# Patient Record
Sex: FEMALE | Race: WHITE | NOT HISPANIC OR LATINO | Employment: FULL TIME | URBAN - METROPOLITAN AREA
[De-identification: names, ages, dates, MRNs, and addresses within clinical notes are randomized per-mention and may not be internally consistent; named-entity substitution may affect disease eponyms.]

---

## 2023-07-13 DIAGNOSIS — R06.00 DYSPNEA, UNSPECIFIED TYPE: Primary | ICD-10-CM

## 2023-07-13 RX ORDER — ALBUTEROL SULFATE 90 UG/1
AEROSOL, METERED RESPIRATORY (INHALATION)
Qty: 8.5 G | Refills: 3 | Status: SHIPPED | OUTPATIENT
Start: 2023-07-13

## 2023-10-02 ENCOUNTER — LAB (OUTPATIENT)
Dept: LAB | Facility: LAB | Age: 38
End: 2023-10-02
Payer: COMMERCIAL

## 2023-10-02 ENCOUNTER — OFFICE VISIT (OUTPATIENT)
Dept: PRIMARY CARE | Facility: CLINIC | Age: 38
End: 2023-10-02
Payer: COMMERCIAL

## 2023-10-02 VITALS
RESPIRATION RATE: 17 BRPM | OXYGEN SATURATION: 98 % | HEART RATE: 78 BPM | DIASTOLIC BLOOD PRESSURE: 86 MMHG | SYSTOLIC BLOOD PRESSURE: 122 MMHG | TEMPERATURE: 98.4 F | WEIGHT: 234 LBS | BODY MASS INDEX: 41.45 KG/M2

## 2023-10-02 DIAGNOSIS — R23.2 HOT FLASHES: ICD-10-CM

## 2023-10-02 DIAGNOSIS — R73.03 PREDIABETES: ICD-10-CM

## 2023-10-02 DIAGNOSIS — R10.12 LEFT UPPER QUADRANT ABDOMINAL PAIN: ICD-10-CM

## 2023-10-02 PROBLEM — E66.9 OBESITY: Status: ACTIVE | Noted: 2023-10-02

## 2023-10-02 PROBLEM — J30.9 ALLERGIC RHINITIS: Status: ACTIVE | Noted: 2023-10-02

## 2023-10-02 PROBLEM — E55.9 VITAMIN D DEFICIENCY: Status: ACTIVE | Noted: 2023-10-02

## 2023-10-02 PROBLEM — B00.9 HERPES SIMPLEX: Status: ACTIVE | Noted: 2023-10-02

## 2023-10-02 PROBLEM — J45.909 ASTHMA (HHS-HCC): Status: ACTIVE | Noted: 2023-10-02

## 2023-10-02 LAB
ALBUMIN SERPL BCP-MCNC: 4.1 G/DL (ref 3.4–5)
ALP SERPL-CCNC: 62 U/L (ref 33–110)
ALT SERPL W P-5'-P-CCNC: 23 U/L (ref 7–45)
ANION GAP SERPL CALC-SCNC: 13 MMOL/L (ref 10–20)
AST SERPL W P-5'-P-CCNC: 15 U/L (ref 9–39)
BILIRUB SERPL-MCNC: 0.3 MG/DL (ref 0–1.2)
BUN SERPL-MCNC: 8 MG/DL (ref 6–23)
CALCIUM SERPL-MCNC: 9.3 MG/DL (ref 8.6–10.3)
CHLORIDE SERPL-SCNC: 104 MMOL/L (ref 98–107)
CO2 SERPL-SCNC: 26 MMOL/L (ref 21–32)
CREAT SERPL-MCNC: 0.75 MG/DL (ref 0.5–1.05)
ERYTHROCYTE [DISTWIDTH] IN BLOOD BY AUTOMATED COUNT: 14.3 % (ref 11.5–14.5)
GFR SERPL CREATININE-BSD FRML MDRD: >90 ML/MIN/1.73M*2
GLUCOSE SERPL-MCNC: 112 MG/DL (ref 74–99)
HCT VFR BLD AUTO: 39.5 % (ref 36–46)
HGB BLD-MCNC: 12.9 G/DL (ref 12–16)
LIPASE SERPL-CCNC: 30 U/L (ref 9–82)
MCH RBC QN AUTO: 26.1 PG (ref 26–34)
MCHC RBC AUTO-ENTMCNC: 32.7 G/DL (ref 32–36)
MCV RBC AUTO: 80 FL (ref 80–100)
NRBC BLD-RTO: 0 /100 WBCS (ref 0–0)
PLATELET # BLD AUTO: 317 X10*3/UL (ref 150–450)
PMV BLD AUTO: 10.8 FL (ref 7.5–11.5)
POTASSIUM SERPL-SCNC: 3.9 MMOL/L (ref 3.5–5.3)
PROT SERPL-MCNC: 6.9 G/DL (ref 6.4–8.2)
RBC # BLD AUTO: 4.94 X10*6/UL (ref 4–5.2)
SODIUM SERPL-SCNC: 139 MMOL/L (ref 136–145)
TSH SERPL-ACNC: 1.13 MIU/L (ref 0.44–3.98)
WBC # BLD AUTO: 11.3 X10*3/UL (ref 4.4–11.3)

## 2023-10-02 PROCEDURE — 36415 COLL VENOUS BLD VENIPUNCTURE: CPT

## 2023-10-02 PROCEDURE — 3008F BODY MASS INDEX DOCD: CPT

## 2023-10-02 PROCEDURE — 99214 OFFICE O/P EST MOD 30 MIN: CPT

## 2023-10-02 RX ORDER — OMALIZUMAB 202.5 MG/1.4ML
150 INJECTION, SOLUTION SUBCUTANEOUS
COMMUNITY
Start: 2022-05-10 | End: 2023-11-07 | Stop reason: SDUPTHER

## 2023-10-02 RX ORDER — MONTELUKAST SODIUM 10 MG/1
10 TABLET ORAL NIGHTLY
COMMUNITY

## 2023-10-02 RX ORDER — BUDESONIDE AND FORMOTEROL FUMARATE DIHYDRATE 80; 4.5 UG/1; UG/1
2 AEROSOL RESPIRATORY (INHALATION)
COMMUNITY
End: 2024-03-12 | Stop reason: SDUPTHER

## 2023-10-02 RX ORDER — FAMOTIDINE 40 MG/1
40 TABLET, FILM COATED ORAL DAILY
Qty: 30 TABLET | Refills: 1 | Status: SHIPPED | OUTPATIENT
Start: 2023-10-02 | End: 2023-12-11

## 2023-10-02 RX ORDER — CETIRIZINE HYDROCHLORIDE 10 MG/1
10 TABLET ORAL DAILY PRN
COMMUNITY
Start: 2023-09-02

## 2023-10-02 RX ORDER — ERGOCALCIFEROL 1.25 MG/1
1 CAPSULE ORAL
COMMUNITY
End: 2023-11-13

## 2023-10-02 RX ORDER — LEVONORGESTREL AND ETHINYL ESTRADIOL AND ETHINYL ESTRADIOL 150-30(84)
1 KIT ORAL DAILY
COMMUNITY
End: 2024-02-12 | Stop reason: SDUPTHER

## 2023-10-02 NOTE — ASSESSMENT & PLAN NOTE
Counseled at length on diet and exercise as well as tracking her food/calories.  Also gave a referral to nutritionist.

## 2023-10-02 NOTE — ASSESSMENT & PLAN NOTE
Patient is likely too young to be premenopausal and she is also taking combined oral contraceptive pills already so do not suspect that her hot flashes are necessarily due to estrogen or progesterone imbalances.  Patient would like her thyroid checked although it was normal in February.  We will recheck it at this time as well as some other basic blood work.

## 2023-10-02 NOTE — PROGRESS NOTES
Subjective   Mable Nino is a 37 y.o. female who presents for Follow-up (Pt wants thyroid checked.).  Patient presents for hot flashes for the last few months.  She describes them as feeling hot, sweaty, clammy.  They happen at least once a day but sometimes happen multiple times a day.  They happen randomly and she cannot predict when they are going to come on.  If she drinks something hot like hot coffee then it gets worse.  She also thinks she may have been a little bit more fatigued than usual recently.  She is on combined oral contraceptive pills currently.  Her period is irregular still.  She does not drink significant amount of caffeine or alcohol.  She denies any new medications.  She denies any significant anxiety before or during these episodes.  Her weight has been stable.  She also thinks she may have had some left upper quadrant abdominal pain for the last few days that is intermittent and a 3-4 out of 10 intensity.  She does not know if this is related at all to her hot flashes.  The pain does not radiate anywhere.  She denies fever, chills, diarrhea, constipation, nausea, vomiting, reflux.  She has not tried anything for it.  He does feel a bit worse after eating.  She is not sure if there are any foods that are triggers for her.    She would also like to try to lose weight.  She has not been tracking calories recently or exercising significantly.  She is interested in possibly speaking with a dietitian.    Objective   /86 (BP Location: Left arm, Patient Position: Sitting, BP Cuff Size: Large adult)   Pulse 78   Temp 36.9 °C (98.4 °F)   Resp 17   Wt 106 kg (234 lb)   SpO2 98%   BMI 41.45 kg/m²    PHYSICAL EXAM  Gen: Well appearing, in NAD  Eyes: EOMI  Heart: RRR, no murmurs  Lungs: No increased work of breathing, CTAB, on RA  GI: Soft, NTND, no guarding or rebound  Extremities: WWP, cap refill <2sec, no pitting edema in LE b/l  Neuro: Alert, symmetrical facies, moves all extremities  equally  Psych: Appropriate mood and affect    Assessment/Plan     Problem List Items Addressed This Visit       Prediabetes    Relevant Orders    Hemoglobin A1C    BMI 40.0-44.9, adult (CMS/[facility]) - Primary     Counseled at length on diet and exercise as well as tracking her food/calories.  Also gave a referral to nutritionist.         Relevant Orders    Referral to Nutrition Services    Left upper quadrant abdominal pain     We will check general lab work including a lipase.  Suspect this could just be gastritis.  We will do a trial of Pepcid as well.  ED precautions given.         Relevant Medications    famotidine (Pepcid) 40 mg tablet    Other Relevant Orders    Lipase    Hot flashes     Patient is likely too young to be premenopausal and she is also taking combined oral contraceptive pills already so do not suspect that her hot flashes are necessarily due to estrogen or progesterone imbalances.  Patient would like her thyroid checked although it was normal in February.  We will recheck it at this time as well as some other basic blood work.         Relevant Orders    CBC    Comprehensive Metabolic Panel    TSH with reflex to Free T4 if abnormal      Tamiko Erazo DO  Family Medicine Resident, PGY-3  [facility]  [address] [place] [address] [phone]

## 2023-10-02 NOTE — ASSESSMENT & PLAN NOTE
We will check general lab work including a lipase.  Suspect this could just be gastritis.  We will do a trial of Pepcid as well.  ED precautions given.

## 2023-10-03 LAB
EST. AVERAGE GLUCOSE BLD GHB EST-MCNC: 154 MG/DL
HBA1C MFR BLD: 7 %

## 2023-10-03 NOTE — PROGRESS NOTES
I reviewed the resident/fellow's documentation and discussed the patient with the resident/fellow. I agree with the resident/fellow's medical decision making as documented in the note.   Patient continues occasional episodes like flashes.  No palpitations.  No flushing.  No chest chest pain or shortness of breath no dyspnea, no calf swelling.  Patient also wants to try to lose weight.  She has had this vague abdominal discomfort left upper quadrant.  No lung pain no coughing.  She points appears to be in stomach area per the resident.  We will treat her for gastritis please see GI.  May need a CT scan.  We will get blood work.  Treated for medication.  Patient can follow-up in a few weeks to discuss weight loss.  Patient aware if any chest pain shortness of breath any nausea vomiting diarrhea headache or increased abdominal pain any worsening symptoms go to ER  Agree with assessment and plan    Raphael Lynn, DO

## 2023-10-05 ENCOUNTER — TELEPHONE (OUTPATIENT)
Dept: PRIMARY CARE | Facility: CLINIC | Age: 38
End: 2023-10-05
Payer: COMMERCIAL

## 2023-10-05 NOTE — RESULT ENCOUNTER NOTE
All lab work is normal except for A1c of 7.0%.  This is a new diagnosis of diabetes for her.  This is technically diet-controlled.  We will work on lifestyle modifications, including weight loss, and a nutritionist referral was placed this week as well. Defer starting metformin at this time. Follow-up in 1 month. Pt updated via telephone.

## 2023-10-27 ENCOUNTER — CLINICAL SUPPORT (OUTPATIENT)
Dept: ALLERGY | Facility: CLINIC | Age: 38
End: 2023-10-27
Payer: COMMERCIAL

## 2023-10-27 DIAGNOSIS — J30.9 ALLERGIC RHINITIS, UNSPECIFIED SEASONALITY, UNSPECIFIED TRIGGER: ICD-10-CM

## 2023-10-27 PROBLEM — J32.4 CHRONIC PANSINUSITIS: Status: ACTIVE | Noted: 2023-10-27

## 2023-10-27 PROBLEM — J32.9 CHRONIC SINUSITIS: Status: ACTIVE | Noted: 2023-10-27

## 2023-10-27 PROBLEM — J33.9 NASAL POLYP: Status: ACTIVE | Noted: 2023-10-27

## 2023-10-27 PROBLEM — R73.01 ELEVATED FASTING GLUCOSE: Status: ACTIVE | Noted: 2023-10-27

## 2023-10-27 PROCEDURE — 95117 IMMUNOTHERAPY INJECTIONS: CPT | Performed by: ALLERGY & IMMUNOLOGY

## 2023-10-27 RX ORDER — ALBUTEROL SULFATE 0.83 MG/ML
SOLUTION RESPIRATORY (INHALATION)
COMMUNITY
Start: 2020-03-11

## 2023-10-27 RX ORDER — ALBUTEROL SULFATE 90 UG/1
AEROSOL, METERED RESPIRATORY (INHALATION)
COMMUNITY
Start: 2020-03-11 | End: 2024-03-04 | Stop reason: WASHOUT

## 2023-11-07 DIAGNOSIS — J33.9 NASAL POLYP: Primary | ICD-10-CM

## 2023-11-07 RX ORDER — OMALIZUMAB 202.5 MG/1.4ML
600 INJECTION, SOLUTION SUBCUTANEOUS
Qty: 4 EACH | Refills: 11 | Status: SHIPPED | OUTPATIENT
Start: 2023-11-07 | End: 2023-11-28 | Stop reason: SDUPTHER

## 2023-11-09 DIAGNOSIS — E55.9 VITAMIN D DEFICIENCY: Primary | ICD-10-CM

## 2023-11-13 RX ORDER — ERGOCALCIFEROL 1.25 MG/1
1 CAPSULE ORAL
Qty: 14 CAPSULE | Refills: 3 | Status: SHIPPED | OUTPATIENT
Start: 2023-11-13

## 2023-11-28 ENCOUNTER — TELEPHONE (OUTPATIENT)
Dept: ALLERGY | Facility: CLINIC | Age: 38
End: 2023-11-28
Payer: COMMERCIAL

## 2023-11-28 DIAGNOSIS — J33.9 NASAL POLYP: ICD-10-CM

## 2023-11-28 RX ORDER — OMALIZUMAB 202.5 MG/1.4ML
600 INJECTION, SOLUTION SUBCUTANEOUS
Qty: 4 EACH | Refills: 11 | Status: SHIPPED | OUTPATIENT
Start: 2023-11-28 | End: 2024-11-27

## 2023-12-01 ENCOUNTER — CLINICAL SUPPORT (OUTPATIENT)
Dept: ALLERGY | Facility: CLINIC | Age: 38
End: 2023-12-01
Payer: COMMERCIAL

## 2023-12-01 DIAGNOSIS — J30.9 ALLERGIC RHINITIS, UNSPECIFIED SEASONALITY, UNSPECIFIED TRIGGER: ICD-10-CM

## 2023-12-01 PROCEDURE — 95117 IMMUNOTHERAPY INJECTIONS: CPT | Performed by: ALLERGY & IMMUNOLOGY

## 2023-12-08 ENCOUNTER — CLINICAL SUPPORT (OUTPATIENT)
Dept: ALLERGY | Facility: CLINIC | Age: 38
End: 2023-12-08
Payer: COMMERCIAL

## 2023-12-08 DIAGNOSIS — J33.9 NASAL POLYP: Primary | ICD-10-CM

## 2023-12-08 PROCEDURE — 96372 THER/PROPH/DIAG INJ SC/IM: CPT | Performed by: ALLERGY & IMMUNOLOGY

## 2023-12-09 DIAGNOSIS — R10.12 LEFT UPPER QUADRANT ABDOMINAL PAIN: ICD-10-CM

## 2023-12-11 RX ORDER — FAMOTIDINE 40 MG/1
40 TABLET, FILM COATED ORAL DAILY
Qty: 90 TABLET | Refills: 1 | Status: SHIPPED | OUTPATIENT
Start: 2023-12-11

## 2024-01-22 DIAGNOSIS — J30.9 ALLERGIC RHINITIS, UNSPECIFIED SEASONALITY, UNSPECIFIED TRIGGER: Primary | ICD-10-CM

## 2024-01-22 PROCEDURE — 95165 ANTIGEN THERAPY SERVICES: CPT | Performed by: ALLERGY & IMMUNOLOGY

## 2024-01-30 ENCOUNTER — APPOINTMENT (OUTPATIENT)
Dept: ALLERGY | Facility: CLINIC | Age: 39
End: 2024-01-30
Payer: COMMERCIAL

## 2024-02-05 ENCOUNTER — OFFICE VISIT (OUTPATIENT)
Dept: OTOLARYNGOLOGY | Facility: CLINIC | Age: 39
End: 2024-02-05
Payer: COMMERCIAL

## 2024-02-05 VITALS
SYSTOLIC BLOOD PRESSURE: 147 MMHG | HEART RATE: 78 BPM | HEIGHT: 63 IN | OXYGEN SATURATION: 96 % | TEMPERATURE: 97.2 F | WEIGHT: 230.38 LBS | RESPIRATION RATE: 16 BRPM | BODY MASS INDEX: 40.82 KG/M2 | DIASTOLIC BLOOD PRESSURE: 93 MMHG

## 2024-02-05 DIAGNOSIS — J33.9 NASAL POLYP: ICD-10-CM

## 2024-02-05 DIAGNOSIS — J34.89 NASAL VESTIBULITIS: ICD-10-CM

## 2024-02-05 DIAGNOSIS — J32.4 CHRONIC PANSINUSITIS: ICD-10-CM

## 2024-02-05 DIAGNOSIS — J30.9 ALLERGIC RHINITIS, UNSPECIFIED SEASONALITY, UNSPECIFIED TRIGGER: Primary | ICD-10-CM

## 2024-02-05 PROCEDURE — 99213 OFFICE O/P EST LOW 20 MIN: CPT | Performed by: STUDENT IN AN ORGANIZED HEALTH CARE EDUCATION/TRAINING PROGRAM

## 2024-02-05 PROCEDURE — 3008F BODY MASS INDEX DOCD: CPT | Performed by: STUDENT IN AN ORGANIZED HEALTH CARE EDUCATION/TRAINING PROGRAM

## 2024-02-05 PROCEDURE — 1036F TOBACCO NON-USER: CPT | Performed by: STUDENT IN AN ORGANIZED HEALTH CARE EDUCATION/TRAINING PROGRAM

## 2024-02-05 RX ORDER — MUPIROCIN 20 MG/G
OINTMENT TOPICAL 2 TIMES DAILY
Qty: 30 G | Refills: 2 | Status: SHIPPED | OUTPATIENT
Start: 2024-02-05 | End: 2024-02-15

## 2024-02-05 RX ORDER — FLUTICASONE PROPIONATE 50 MCG
2 SPRAY, SUSPENSION (ML) NASAL DAILY
Qty: 16 G | Refills: 11 | Status: SHIPPED | OUTPATIENT
Start: 2024-02-05 | End: 2025-02-04

## 2024-02-05 SDOH — ECONOMIC STABILITY: INCOME INSECURITY: IN THE LAST 12 MONTHS, WAS THERE A TIME WHEN YOU WERE NOT ABLE TO PAY THE MORTGAGE OR RENT ON TIME?: NO

## 2024-02-05 SDOH — ECONOMIC STABILITY: FOOD INSECURITY: WITHIN THE PAST 12 MONTHS, THE FOOD YOU BOUGHT JUST DIDN'T LAST AND YOU DIDN'T HAVE MONEY TO GET MORE.: NEVER TRUE

## 2024-02-05 SDOH — ECONOMIC STABILITY: HOUSING INSECURITY
IN THE LAST 12 MONTHS, WAS THERE A TIME WHEN YOU DID NOT HAVE A STEADY PLACE TO SLEEP OR SLEPT IN A SHELTER (INCLUDING NOW)?: NO

## 2024-02-05 SDOH — ECONOMIC STABILITY: FOOD INSECURITY: WITHIN THE PAST 12 MONTHS, YOU WORRIED THAT YOUR FOOD WOULD RUN OUT BEFORE YOU GOT MONEY TO BUY MORE.: NEVER TRUE

## 2024-02-05 ASSESSMENT — ENCOUNTER SYMPTOMS
LOSS OF SENSATION IN FEET: 0
DEPRESSION: 0
OCCASIONAL FEELINGS OF UNSTEADINESS: 0

## 2024-02-05 ASSESSMENT — COLUMBIA-SUICIDE SEVERITY RATING SCALE - C-SSRS
1. IN THE PAST MONTH, HAVE YOU WISHED YOU WERE DEAD OR WISHED YOU COULD GO TO SLEEP AND NOT WAKE UP?: NO
2. HAVE YOU ACTUALLY HAD ANY THOUGHTS OF KILLING YOURSELF?: NO
6. HAVE YOU EVER DONE ANYTHING, STARTED TO DO ANYTHING, OR PREPARED TO DO ANYTHING TO END YOUR LIFE?: NO

## 2024-02-05 ASSESSMENT — PATIENT HEALTH QUESTIONNAIRE - PHQ9
1. LITTLE INTEREST OR PLEASURE IN DOING THINGS: NOT AT ALL
SUM OF ALL RESPONSES TO PHQ9 QUESTIONS 1 AND 2: 0
2. FEELING DOWN, DEPRESSED OR HOPELESS: NOT AT ALL

## 2024-02-05 ASSESSMENT — LIFESTYLE VARIABLES
HOW OFTEN DO YOU HAVE SIX OR MORE DRINKS ON ONE OCCASION: NEVER
SKIP TO QUESTIONS 9-10: 1
HOW OFTEN DO YOU HAVE A DRINK CONTAINING ALCOHOL: MONTHLY OR LESS
HOW MANY STANDARD DRINKS CONTAINING ALCOHOL DO YOU HAVE ON A TYPICAL DAY: 1 OR 2
AUDIT-C TOTAL SCORE: 1

## 2024-02-05 ASSESSMENT — PAIN SCALES - GENERAL: PAINLEVEL: 0-NO PAIN

## 2024-02-05 NOTE — PROGRESS NOTES
SUBJECTIVE  Patient ID: Mable Nino is a 38 y.o. female who presents for Follow-up.    History 8/15/2023:  37 year-old female referred by my former colleague, Dr. Patricio Ayala, for CRSwNP.     The patient reports longstanding history of environmental allergies and asthma. Was eventually referred for evaluation of nasal polyps. Currently sees Dr. Thompson for IT and Xolair. She has not been using any nasal medications medications. Uses Flonase as needed. Irrigates occasionally. Takes a daily montelukast and cetirizine. She is now status post bilateral endoscopic sinus surgery performed 1/20/2022. At that time the patient had very severe nasal congestion and obstruction as well as decreased sense of smell. Currently relatively asymptomatic.     Update 2/5/2024:  Follow-up for CRSwNP. She has been doing very well. Recently had a URI but did not need antibiotics. She is using Flonase daily. Irrigating occasionally. Still getting IT and Xolair. Had some irritation along the right nasal sill; seems to have improved.  Still has sense of smell.    OBJECTIVE  Physical Exam  CONSTITUTIONAL: Well appearing female who appears stated age. Elevated BMI.  VOICE: Clear speech without hoarseness. No stridor nor stertor.  HEAD, FACE, AND SKIN: Symmetric facial feature.  NOSE: Nasal dorsum was midline. Anterior rhinoscopy demonstrated a septum midline and intact. Inferior turbinates were moderately hypertrophied on the right and mildly on the left, consistent with nasal cycle. On the right there appears to be thick nasal drainage tracking with nasal polyps at the middle meatus. On the left it appears that the middle turbinate has been resected. There are nasal polypos at the middle meatus. The nasal cavities are otherwise relatively patent.  OROPHARYNX: No lesion nor mucosal abnormality. The uvula was normal appearing. The tonsils were 1+. No drainage.  RESPIRATORY: Normal inspiration and expiration and chest wall expansion; no  use of accessory muscles to breathe.  PSYCHIATRIC: Alert, appropriate mood and affect.     ASSESSMENT/PLAN  Diagnoses and all orders for this visit:  Allergic rhinitis, unspecified seasonality, unspecified trigger  Chronic pansinusitis  Nasal polyp  Nasal vestibulitis    38 y.o. female  referred by my former colleague for chronic sinusitis with nasal polyposis.     The patient reports longstanding history of environmental allergies and asthma. She was eventually noted to have developed nasal polyposis. She is now status post endoscopic sinus surgery January 2022.  She continues to have relatively stable bilateral nasal polyposis; the nasal passages are otherwise fairly clear. She is relatively asymptomatic.     I recommended she continue her current regimen with Dr. Thompson including immunotherapy, Xolair, daily montelukast, and daily cetirizine.  I recommended she continue her nasal hygiene regimen with daily fluticasone nasal spray.  I continued to encourage irrigations.  We discussed that in the future as she becomes more symptomatic she may require revision surgery versus consideration of application of Sinuva device.  For now she wishes to continue medical therapy.    In addition, after the patient's recent upper respiratory infection appears that she has developed some irritation of the nasal sill consistent with nasal vestibulitis.  We discussed use of mupirocin ointment to help manage this.    She will follow-up in 12 months to check on her progress, earlier as needed.    This note was created using speech recognition transcription software. Despite proofreading, typographical errors may be present that affect the meaning of the content. Please contact my office with any questions.

## 2024-02-09 ENCOUNTER — APPOINTMENT (OUTPATIENT)
Dept: ALLERGY | Facility: CLINIC | Age: 39
End: 2024-02-09
Payer: COMMERCIAL

## 2024-02-12 ENCOUNTER — APPOINTMENT (OUTPATIENT)
Dept: OTOLARYNGOLOGY | Facility: CLINIC | Age: 39
End: 2024-02-12
Payer: COMMERCIAL

## 2024-03-04 ENCOUNTER — APPOINTMENT (OUTPATIENT)
Dept: OBSTETRICS AND GYNECOLOGY | Facility: CLINIC | Age: 39
End: 2024-03-04
Payer: COMMERCIAL

## 2024-03-04 ENCOUNTER — TELEMEDICINE (OUTPATIENT)
Dept: OBSTETRICS AND GYNECOLOGY | Facility: CLINIC | Age: 39
End: 2024-03-04
Payer: COMMERCIAL

## 2024-03-04 DIAGNOSIS — Z30.9 ENCOUNTER FOR CONTRACEPTIVE MANAGEMENT, UNSPECIFIED TYPE: Primary | ICD-10-CM

## 2024-03-04 PROCEDURE — 3008F BODY MASS INDEX DOCD: CPT | Performed by: ADVANCED PRACTICE MIDWIFE

## 2024-03-04 PROCEDURE — 99213 OFFICE O/P EST LOW 20 MIN: CPT | Performed by: ADVANCED PRACTICE MIDWIFE

## 2024-03-04 PROCEDURE — 1036F TOBACCO NON-USER: CPT | Performed by: ADVANCED PRACTICE MIDWIFE

## 2024-03-04 NOTE — PROGRESS NOTES
"Ml: Discuss BC options  HPI:  Patient currently taking OCPs.  Patient is sexually active.    youngest is 17 yo  LMP absent due to continuous OCPs.  Patient would like to discuss Nexplanon as a more permanent option for BC.   ROS:  GYN: Pain in vagina and to left side of pelvix x1 and cramps after intercourse  All other system negative  PE:  Constitutional: no acute distress, well nourished, alert and oriented  Head and Neck: normocephalic  Lungs: respirations unlabored  Neuropsych: normal mood affect, well groomed good eye contact  Muskuloskeletal: normal posture  A: Would like to discontinue daily OCP      No contraindications to progestin only/nexplanon  P: Risks benefits and alternative including the Mirena IUD discussed  Has heard \"horror\" stories about Mirena.  Counseled on risks of IUD would like to proceed with Nexplanon.  Also referred  to Aimee for vasectomy as family is complete    "

## 2024-03-12 DIAGNOSIS — J45.909 ASTHMA, UNSPECIFIED ASTHMA SEVERITY, UNSPECIFIED WHETHER COMPLICATED, UNSPECIFIED WHETHER PERSISTENT (HHS-HCC): Primary | ICD-10-CM

## 2024-03-12 RX ORDER — BUDESONIDE AND FORMOTEROL FUMARATE DIHYDRATE 80; 4.5 UG/1; UG/1
2 AEROSOL RESPIRATORY (INHALATION)
Qty: 10.2 G | Refills: 5 | Status: SHIPPED | OUTPATIENT
Start: 2024-03-12

## 2024-03-14 ENCOUNTER — PROCEDURE VISIT (OUTPATIENT)
Dept: OBSTETRICS AND GYNECOLOGY | Facility: CLINIC | Age: 39
End: 2024-03-14
Payer: COMMERCIAL

## 2024-03-14 VITALS — SYSTOLIC BLOOD PRESSURE: 122 MMHG | BODY MASS INDEX: 41.45 KG/M2 | WEIGHT: 234 LBS | DIASTOLIC BLOOD PRESSURE: 80 MMHG

## 2024-03-14 DIAGNOSIS — Z30.017 NEXPLANON INSERTION: Primary | ICD-10-CM

## 2024-03-14 LAB — PREGNANCY TEST URINE, POC: NEGATIVE

## 2024-03-14 PROCEDURE — 81025 URINE PREGNANCY TEST: CPT | Performed by: ADVANCED PRACTICE MIDWIFE

## 2024-03-14 PROCEDURE — 11981 INSERTION DRUG DLVR IMPLANT: CPT | Performed by: ADVANCED PRACTICE MIDWIFE

## 2024-03-14 RX ORDER — LIDOCAINE HYDROCHLORIDE 20 MG/ML
2 INJECTION, SOLUTION INFILTRATION; PERINEURAL ONCE
Status: COMPLETED | OUTPATIENT
Start: 2024-03-14 | End: 2024-03-14

## 2024-03-14 RX ORDER — LIDOCAINE HYDROCHLORIDE 10 MG/ML
2 INJECTION, SOLUTION EPIDURAL; INFILTRATION; INTRACAUDAL; PERINEURAL ONCE
Status: CANCELLED | OUTPATIENT
Start: 2024-03-14 | End: 2024-03-14

## 2024-03-14 RX ORDER — LIDOCAINE HYDROCHLORIDE 20 MG/ML
2 INJECTION, SOLUTION EPIDURAL; INFILTRATION; INTRACAUDAL; PERINEURAL ONCE
Status: DISCONTINUED | OUTPATIENT
Start: 2024-03-14 | End: 2024-03-14

## 2024-03-14 RX ORDER — LIDOCAINE HYDROCHLORIDE 20 MG/ML
2 INJECTION, SOLUTION INFILTRATION; PERINEURAL ONCE
Status: DISCONTINUED | OUTPATIENT
Start: 2024-03-14 | End: 2024-03-14

## 2024-03-14 RX ADMIN — LIDOCAINE HYDROCHLORIDE 2 MG: 20 INJECTION, SOLUTION INFILTRATION; PERINEURAL at 10:04

## 2024-03-14 ASSESSMENT — PATIENT HEALTH QUESTIONNAIRE - PHQ9
SUM OF ALL RESPONSES TO PHQ9 QUESTIONS 1 & 2: 0
2. FEELING DOWN, DEPRESSED OR HOPELESS: NOT AT ALL
1. LITTLE INTEREST OR PLEASURE IN DOING THINGS: NOT AT ALL

## 2024-03-14 NOTE — PROGRESS NOTES
Subdermal Contraceptive Implant Insertion Note  Mable Nino desires a subdermal etonogestrel contraceptive implant insertion. She has been counseled regarding the risks, benefits and alternatives to the implant. She especially understands that her menstrual periods are expected to become irregular and unpredictable throughout the time she is using the implant. She has no contraindications to the insertion. Her questions have been answered. She has fully reviewed the FDA-approved consent brochure, has signed the consent form, and wishes to proceed with the insertion today.     Current method of contraception:  oral contraceptives (estrogen/progesterone)    No LMP recorded (lmp unknown). Patient has had an implant.    Urine pregnancy test: negative    Procedure Details  The inner side of the left arm was cleaned with alcohol and infiltrated with 2% lidocaine. The contraceptive godfrey was inserted according to the 's instructions without complications. The godfrey was palpable under the skin after the insertion. The insertion site was closed with Steri-strip and a pressure dressing was applied.     Successful insertion of nexplanon device.    Mable was given post-insertion instructions. She understands that the implant must be removed at the end of 5 years and may be removed sooner if she wishes.    Other follow-up needed:  none    XIME[facility] [place]

## 2024-03-15 ENCOUNTER — CLINICAL SUPPORT (OUTPATIENT)
Dept: ALLERGY | Facility: CLINIC | Age: 39
End: 2024-03-15
Payer: COMMERCIAL

## 2024-03-15 DIAGNOSIS — J30.2 SEASONAL ALLERGIES: ICD-10-CM

## 2024-03-15 PROCEDURE — 95117 IMMUNOTHERAPY INJECTIONS: CPT | Performed by: ALLERGY & IMMUNOLOGY

## 2024-04-12 ENCOUNTER — CLINICAL SUPPORT (OUTPATIENT)
Dept: ALLERGY | Facility: CLINIC | Age: 39
End: 2024-04-12
Payer: COMMERCIAL

## 2024-04-12 DIAGNOSIS — J30.2 SEASONAL ALLERGIES: ICD-10-CM

## 2024-04-12 PROCEDURE — 95117 IMMUNOTHERAPY INJECTIONS: CPT | Performed by: ALLERGY & IMMUNOLOGY

## 2024-05-10 ENCOUNTER — CLINICAL SUPPORT (OUTPATIENT)
Dept: ALLERGY | Facility: CLINIC | Age: 39
End: 2024-05-10
Payer: COMMERCIAL

## 2024-05-10 DIAGNOSIS — J30.2 SEASONAL ALLERGIES: ICD-10-CM

## 2024-05-10 PROCEDURE — 95117 IMMUNOTHERAPY INJECTIONS: CPT | Performed by: ALLERGY & IMMUNOLOGY

## 2024-06-07 ENCOUNTER — CLINICAL SUPPORT (OUTPATIENT)
Dept: ALLERGY | Facility: CLINIC | Age: 39
End: 2024-06-07
Payer: COMMERCIAL

## 2024-06-07 DIAGNOSIS — J30.2 SEASONAL ALLERGIES: ICD-10-CM

## 2024-06-07 PROCEDURE — 95117 IMMUNOTHERAPY INJECTIONS: CPT | Performed by: ALLERGY & IMMUNOLOGY

## 2024-07-12 ENCOUNTER — APPOINTMENT (OUTPATIENT)
Dept: ALLERGY | Facility: CLINIC | Age: 39
End: 2024-07-12
Payer: COMMERCIAL

## 2024-07-26 ENCOUNTER — APPOINTMENT (OUTPATIENT)
Dept: ALLERGY | Facility: CLINIC | Age: 39
End: 2024-07-26
Payer: COMMERCIAL

## 2024-08-13 ENCOUNTER — CLINICAL SUPPORT (OUTPATIENT)
Dept: ALLERGY | Facility: CLINIC | Age: 39
End: 2024-08-13
Payer: COMMERCIAL

## 2024-08-13 DIAGNOSIS — J30.2 SEASONAL ALLERGIES: ICD-10-CM

## 2024-08-13 PROCEDURE — 95117 IMMUNOTHERAPY INJECTIONS: CPT | Performed by: ALLERGY & IMMUNOLOGY

## 2024-09-10 ENCOUNTER — APPOINTMENT (OUTPATIENT)
Dept: ALLERGY | Facility: CLINIC | Age: 39
End: 2024-09-10
Payer: COMMERCIAL

## 2024-09-10 DIAGNOSIS — J30.2 SEASONAL ALLERGIES: ICD-10-CM

## 2024-09-10 PROCEDURE — 95117 IMMUNOTHERAPY INJECTIONS: CPT | Performed by: ALLERGY & IMMUNOLOGY

## 2024-10-15 ENCOUNTER — APPOINTMENT (OUTPATIENT)
Dept: ALLERGY | Facility: CLINIC | Age: 39
End: 2024-10-15
Payer: COMMERCIAL

## 2024-10-18 ENCOUNTER — CLINICAL SUPPORT (OUTPATIENT)
Dept: ALLERGY | Facility: CLINIC | Age: 39
End: 2024-10-18
Payer: COMMERCIAL

## 2024-10-18 DIAGNOSIS — J30.2 SEASONAL ALLERGIES: ICD-10-CM

## 2024-10-18 PROCEDURE — 95117 IMMUNOTHERAPY INJECTIONS: CPT | Performed by: ALLERGY & IMMUNOLOGY

## 2024-10-21 DIAGNOSIS — J33.9 NASAL POLYP: Primary | ICD-10-CM

## 2024-10-22 DIAGNOSIS — J33.9 NASAL POLYP: ICD-10-CM

## 2024-10-22 DIAGNOSIS — J33.9 NASAL POLYP: Primary | ICD-10-CM

## 2024-10-22 RX ORDER — OMALIZUMAB 150 MG/ML
INJECTION, SOLUTION SUBCUTANEOUS
Qty: 4 ML | Refills: 2 | OUTPATIENT
Start: 2024-10-22

## 2024-10-22 RX ORDER — OMALIZUMAB 300 MG/2ML
600 INJECTION, SOLUTION SUBCUTANEOUS
Qty: 4 ML | Refills: 0 | Status: SHIPPED | OUTPATIENT
Start: 2024-10-22 | End: 2024-10-29 | Stop reason: SDUPTHER

## 2024-10-22 RX ORDER — OMALIZUMAB 202.5 MG/1.4ML
600 INJECTION, SOLUTION SUBCUTANEOUS
Qty: 4 EACH | Refills: 0 | Status: SHIPPED | OUTPATIENT
Start: 2024-10-22 | End: 2024-10-22 | Stop reason: ALTCHOICE

## 2024-10-28 PROBLEM — J30.2 SEASONAL ALLERGIES: Status: RESOLVED | Noted: 2024-05-10 | Resolved: 2024-10-28

## 2024-10-29 ENCOUNTER — APPOINTMENT (OUTPATIENT)
Dept: ALLERGY | Facility: CLINIC | Age: 39
End: 2024-10-29
Payer: COMMERCIAL

## 2024-10-29 VITALS — OXYGEN SATURATION: 98 % | BODY MASS INDEX: 40.92 KG/M2 | WEIGHT: 231 LBS | HEART RATE: 90 BPM

## 2024-10-29 DIAGNOSIS — J45.20 MILD INTERMITTENT ASTHMA WITHOUT COMPLICATION (HHS-HCC): ICD-10-CM

## 2024-10-29 DIAGNOSIS — J33.9 NASAL POLYP: ICD-10-CM

## 2024-10-29 DIAGNOSIS — J30.9 ALLERGIC RHINITIS, UNSPECIFIED SEASONALITY, UNSPECIFIED TRIGGER: Primary | ICD-10-CM

## 2024-10-29 PROCEDURE — 1036F TOBACCO NON-USER: CPT | Performed by: ALLERGY & IMMUNOLOGY

## 2024-10-29 PROCEDURE — 99214 OFFICE O/P EST MOD 30 MIN: CPT | Performed by: ALLERGY & IMMUNOLOGY

## 2024-10-29 PROCEDURE — 96160 PT-FOCUSED HLTH RISK ASSMT: CPT | Performed by: ALLERGY & IMMUNOLOGY

## 2024-10-29 RX ORDER — OMALIZUMAB 300 MG/2ML
600 INJECTION, SOLUTION SUBCUTANEOUS
Qty: 4 ML | Refills: 11 | Status: SHIPPED | OUTPATIENT
Start: 2024-10-29 | End: 2025-10-29

## 2024-10-29 RX ORDER — MUPIROCIN 20 MG/G
OINTMENT TOPICAL
COMMUNITY
Start: 2024-05-13

## 2024-11-15 ENCOUNTER — APPOINTMENT (OUTPATIENT)
Dept: ALLERGY | Facility: CLINIC | Age: 39
End: 2024-11-15
Payer: COMMERCIAL

## 2024-11-15 DIAGNOSIS — J30.2 SEASONAL ALLERGIES: ICD-10-CM

## 2024-11-15 PROCEDURE — 95117 IMMUNOTHERAPY INJECTIONS: CPT | Performed by: ALLERGY & IMMUNOLOGY

## 2024-12-10 ENCOUNTER — APPOINTMENT (OUTPATIENT)
Dept: ALLERGY | Facility: CLINIC | Age: 39
End: 2024-12-10
Payer: COMMERCIAL

## 2024-12-10 DIAGNOSIS — J30.2 SEASONAL ALLERGIES: ICD-10-CM

## 2024-12-10 PROCEDURE — 95117 IMMUNOTHERAPY INJECTIONS: CPT | Performed by: ALLERGY & IMMUNOLOGY

## 2025-01-07 ENCOUNTER — APPOINTMENT (OUTPATIENT)
Dept: ALLERGY | Facility: CLINIC | Age: 40
End: 2025-01-07
Payer: COMMERCIAL

## 2025-01-07 DIAGNOSIS — J30.2 SEASONAL ALLERGIES: ICD-10-CM

## 2025-01-07 PROCEDURE — 95117 IMMUNOTHERAPY INJECTIONS: CPT | Performed by: ALLERGY & IMMUNOLOGY

## 2025-01-27 DIAGNOSIS — J30.81 ANIMAL DANDER ALLERGY: Primary | ICD-10-CM

## 2025-01-27 DIAGNOSIS — J30.1 HAY FEVER: ICD-10-CM

## 2025-01-27 DIAGNOSIS — J30.89 PERENNIAL ALLERGIC RHINITIS: ICD-10-CM

## 2025-01-27 PROCEDURE — 95165 ANTIGEN THERAPY SERVICES: CPT | Performed by: ALLERGY & IMMUNOLOGY

## 2025-02-04 ENCOUNTER — APPOINTMENT (OUTPATIENT)
Dept: ALLERGY | Facility: CLINIC | Age: 40
End: 2025-02-04
Payer: COMMERCIAL

## 2025-02-04 DIAGNOSIS — J30.2 SEASONAL ALLERGIES: ICD-10-CM

## 2025-02-04 PROCEDURE — 95117 IMMUNOTHERAPY INJECTIONS: CPT | Performed by: ALLERGY & IMMUNOLOGY

## 2025-03-04 ENCOUNTER — APPOINTMENT (OUTPATIENT)
Dept: ALLERGY | Facility: CLINIC | Age: 40
End: 2025-03-04
Payer: COMMERCIAL

## 2025-03-04 DIAGNOSIS — J30.2 SEASONAL ALLERGIES: ICD-10-CM

## 2025-03-04 PROCEDURE — 95117 IMMUNOTHERAPY INJECTIONS: CPT | Performed by: ALLERGY & IMMUNOLOGY

## 2025-04-01 ENCOUNTER — APPOINTMENT (OUTPATIENT)
Dept: ALLERGY | Facility: CLINIC | Age: 40
End: 2025-04-01
Payer: COMMERCIAL

## 2025-04-15 ENCOUNTER — CLINICAL SUPPORT (OUTPATIENT)
Dept: ALLERGY | Facility: CLINIC | Age: 40
End: 2025-04-15
Payer: COMMERCIAL

## 2025-04-15 DIAGNOSIS — J45.909 ASTHMA, UNSPECIFIED ASTHMA SEVERITY, UNSPECIFIED WHETHER COMPLICATED, UNSPECIFIED WHETHER PERSISTENT (HHS-HCC): ICD-10-CM

## 2025-04-15 DIAGNOSIS — J30.9 ALLERGIC RHINITIS, UNSPECIFIED SEASONALITY, UNSPECIFIED TRIGGER: ICD-10-CM

## 2025-04-15 DIAGNOSIS — J30.2 SEASONAL ALLERGIES: Primary | ICD-10-CM

## 2025-04-15 PROCEDURE — 95117 IMMUNOTHERAPY INJECTIONS: CPT | Performed by: ALLERGY & IMMUNOLOGY

## 2025-04-15 RX ORDER — MONTELUKAST SODIUM 10 MG/1
10 TABLET ORAL NIGHTLY
Qty: 90 TABLET | Refills: 0 | Status: SHIPPED | OUTPATIENT
Start: 2025-04-15

## 2025-04-16 RX ORDER — DILTIAZEM HYDROCHLORIDE 60 MG/1
2 TABLET, FILM COATED ORAL 2 TIMES DAILY
Qty: 10.2 G | Refills: 0 | Status: SHIPPED | OUTPATIENT
Start: 2025-04-16

## 2025-05-13 ENCOUNTER — APPOINTMENT (OUTPATIENT)
Dept: ALLERGY | Facility: CLINIC | Age: 40
End: 2025-05-13
Payer: COMMERCIAL

## 2025-05-13 DIAGNOSIS — J30.2 SEASONAL ALLERGIES: ICD-10-CM

## 2025-05-13 PROCEDURE — 95117 IMMUNOTHERAPY INJECTIONS: CPT | Performed by: ALLERGY & IMMUNOLOGY

## 2025-05-14 ENCOUNTER — APPOINTMENT (OUTPATIENT)
Dept: OTOLARYNGOLOGY | Facility: CLINIC | Age: 40
End: 2025-05-14
Payer: COMMERCIAL

## 2025-06-10 ENCOUNTER — APPOINTMENT (OUTPATIENT)
Dept: ALLERGY | Facility: CLINIC | Age: 40
End: 2025-06-10
Payer: COMMERCIAL

## 2025-07-09 ENCOUNTER — OFFICE VISIT (OUTPATIENT)
Dept: OTOLARYNGOLOGY | Facility: CLINIC | Age: 40
End: 2025-07-09
Payer: COMMERCIAL

## 2025-07-09 VITALS
HEIGHT: 63 IN | TEMPERATURE: 98.3 F | BODY MASS INDEX: 41.37 KG/M2 | SYSTOLIC BLOOD PRESSURE: 152 MMHG | RESPIRATION RATE: 16 BRPM | DIASTOLIC BLOOD PRESSURE: 96 MMHG | WEIGHT: 233.5 LBS | OXYGEN SATURATION: 97 % | HEART RATE: 78 BPM

## 2025-07-09 DIAGNOSIS — J30.9 ALLERGIC RHINITIS, UNSPECIFIED SEASONALITY, UNSPECIFIED TRIGGER: ICD-10-CM

## 2025-07-09 DIAGNOSIS — J32.4 CHRONIC PANSINUSITIS: Primary | ICD-10-CM

## 2025-07-09 DIAGNOSIS — J33.9 NASAL POLYP: ICD-10-CM

## 2025-07-09 PROCEDURE — 99214 OFFICE O/P EST MOD 30 MIN: CPT | Performed by: STUDENT IN AN ORGANIZED HEALTH CARE EDUCATION/TRAINING PROGRAM

## 2025-07-09 PROCEDURE — 3008F BODY MASS INDEX DOCD: CPT | Performed by: STUDENT IN AN ORGANIZED HEALTH CARE EDUCATION/TRAINING PROGRAM

## 2025-07-09 PROCEDURE — 1036F TOBACCO NON-USER: CPT | Performed by: STUDENT IN AN ORGANIZED HEALTH CARE EDUCATION/TRAINING PROGRAM

## 2025-07-09 ASSESSMENT — ENCOUNTER SYMPTOMS
OCCASIONAL FEELINGS OF UNSTEADINESS: 0
LOSS OF SENSATION IN FEET: 0
DEPRESSION: 0

## 2025-07-09 ASSESSMENT — PAIN SCALES - GENERAL: PAINLEVEL_OUTOF10: 0-NO PAIN

## 2025-07-09 NOTE — PROGRESS NOTES
SUBJECTIVE  Patient ID: Mable Nino is a 39 y.o. female who presents for Follow-up.    History 8/15/2023:  37 year-old female referred by my former colleague, Dr. Patricio Ayala, for CRSwNP.     The patient reports longstanding history of environmental allergies and asthma. Was eventually referred for evaluation of nasal polyps. Currently sees Dr. Thompson for IT and Xolair. She has not been using any nasal medications medications. Uses Flonase as needed. Irrigates occasionally. Takes a daily montelukast and cetirizine. She is now status post bilateral endoscopic sinus surgery performed 1/20/2022. At that time the patient had very severe nasal congestion and obstruction as well as decreased sense of smell. Currently relatively asymptomatic.     Update 2/5/2024:  Follow-up for CRSwNP. She has been doing very well. Recently had a URI but did not need antibiotics. She is using Flonase daily. Irrigating occasionally. Still getting IT and Xolair. Had some irritation along the right nasal sill; seems to have improved.  Still has sense of smell.    Update 7/9/2025:  Follow-up for CRSwNP. Generally doing well. Taking Singulair daily (tried coming off it but finds it helpful for nasal breathing and sense of smell). Getting monthly IT and Xolair. Using Flonase only intermittently. Irrigating occasionally for nasal congestion.    OBJECTIVE  Physical Exam  CONSTITUTIONAL: Well appearing female who appears stated age. Elevated BMI.  RESPIRATORY: Normal inspiration and expiration and chest wall expansion; no use of accessory muscles to breathe.  PSYCHIATRIC: Alert, appropriate mood and affect.   VOICE: Clear speech without hoarseness. No stridor nor stertor.  HEAD, FACE, AND SKIN: Symmetric facial feature.  EARS: Ear canals clear bialterally. Tms intact and in a neutral position.  NOSE: Nasal dorsum was midline. Anterior rhinoscopy demonstrated a septum midline and intact. Inferior turbinates were mildly hypertrophied  bilaterally. On the right there has been interval growth of nasal polyposis tracking from the middle meatus over the inferior turbinate into the nasal cavity.  On the left there is slight progression nasal polyposis filling the middle meatus.  Thick nasal crusting cleared on the left. The nasal cavities are otherwise patent.  OROPHARYNX: No lesion nor mucosal abnormality. The uvula was normal appearing. The tonsils were 1+. No drainage.    ASSESSMENT/PLAN  Diagnoses and all orders for this visit:  Chronic pansinusitis  Nasal polyp  Allergic rhinitis, unspecified seasonality, unspecified trigger      39 y.o. female  referred by my former colleague for chronic sinusitis with nasal polyposis.     The patient reports longstanding history of environmental allergies and asthma. She was eventually noted to have developed nasal polyposis. She is now status post endoscopic sinus surgery January 2022.  She generally reports few symptoms.    Since her last visit in 2024 the patient reports that she has been less consistent with use of a nasal hygiene regimen.  On reexamination there is been interval growth of nasal polyposis, greater on the right.  We discussed her options moving forward I recommend that she restart some type of nasal hygiene regimen with a nasal steroid spray.  She is interested in potentially trialing Xhance if approved by insurance.  If this is not approved or out of her price range I would recommend restarting fluticasone nasal spray.  We will have her follow-up in 4 to 6 months to check on her progress; we may also want to consider addition of azelastine nasal spray depending on continued progression of polyposis.     In the meantime, I recommended she continue her current regimen with Dr. Thompson including immunotherapy, Xolair, daily montelukast, and daily cetirizine as needed.  She is interested in avoiding revision surgery as long as possible.    This note was created using speech recognition  transcription software. Despite proofreading, typographical errors may be present that affect the meaning of the content. Please contact my office with any questions.

## 2025-07-10 ENCOUNTER — SPECIALTY PHARMACY (OUTPATIENT)
Dept: PHARMACY | Facility: CLINIC | Age: 40
End: 2025-07-10

## 2025-07-15 ENCOUNTER — APPOINTMENT (OUTPATIENT)
Dept: ALLERGY | Facility: CLINIC | Age: 40
End: 2025-07-15
Payer: COMMERCIAL

## 2025-07-15 DIAGNOSIS — J30.2 SEASONAL ALLERGIES: ICD-10-CM

## 2025-07-15 PROCEDURE — 95117 IMMUNOTHERAPY INJECTIONS: CPT | Performed by: ALLERGY & IMMUNOLOGY

## 2025-07-21 DIAGNOSIS — J33.9 NASAL POLYP: Primary | ICD-10-CM

## 2025-07-21 RX ORDER — OMALIZUMAB 300 MG/2ML
600 INJECTION, SOLUTION SUBCUTANEOUS
Qty: 4 ML | Refills: 2 | Status: SHIPPED | OUTPATIENT
Start: 2025-07-21 | End: 2026-07-21

## 2025-07-24 PROCEDURE — RXMED WILLOW AMBULATORY MEDICATION CHARGE

## 2025-07-25 ENCOUNTER — SPECIALTY PHARMACY (OUTPATIENT)
Dept: PHARMACY | Facility: CLINIC | Age: 40
End: 2025-07-25

## 2025-07-31 ENCOUNTER — PHARMACY VISIT (OUTPATIENT)
Dept: PHARMACY | Facility: CLINIC | Age: 40
End: 2025-07-31
Payer: COMMERCIAL

## 2025-08-13 ENCOUNTER — APPOINTMENT (OUTPATIENT)
Dept: ALLERGY | Facility: CLINIC | Age: 40
End: 2025-08-13
Payer: COMMERCIAL

## 2025-08-13 DIAGNOSIS — J30.2 SEASONAL ALLERGIES: ICD-10-CM

## 2025-08-13 PROCEDURE — 95117 IMMUNOTHERAPY INJECTIONS: CPT | Performed by: ALLERGY & IMMUNOLOGY

## 2025-09-09 ENCOUNTER — APPOINTMENT (OUTPATIENT)
Dept: ALLERGY | Facility: CLINIC | Age: 40
End: 2025-09-09
Payer: COMMERCIAL